# Patient Record
Sex: FEMALE | Race: WHITE | URBAN - METROPOLITAN AREA
[De-identification: names, ages, dates, MRNs, and addresses within clinical notes are randomized per-mention and may not be internally consistent; named-entity substitution may affect disease eponyms.]

---

## 2021-12-10 ENCOUNTER — OUTPATIENT (OUTPATIENT)
Dept: OUTPATIENT SERVICES | Facility: HOSPITAL | Age: 51
LOS: 1 days | Discharge: STILL ADMITTED OR ADMINISTRATIVE | End: 2021-12-10
Payer: COMMERCIAL

## 2021-12-13 DIAGNOSIS — F41.9 ANXIETY DISORDER, UNSPECIFIED: ICD-10-CM

## 2022-11-11 PROBLEM — Z00.00 ENCOUNTER FOR PREVENTIVE HEALTH EXAMINATION: Status: ACTIVE | Noted: 2022-11-11

## 2022-11-18 ENCOUNTER — LABORATORY RESULT (OUTPATIENT)
Age: 52
End: 2022-11-18

## 2022-11-18 ENCOUNTER — APPOINTMENT (OUTPATIENT)
Dept: CARDIOLOGY | Facility: CLINIC | Age: 52
End: 2022-11-18

## 2022-11-18 ENCOUNTER — NON-APPOINTMENT (OUTPATIENT)
Age: 52
End: 2022-11-18

## 2022-11-18 VITALS
HEART RATE: 62 BPM | HEIGHT: 62 IN | BODY MASS INDEX: 29.26 KG/M2 | SYSTOLIC BLOOD PRESSURE: 144 MMHG | DIASTOLIC BLOOD PRESSURE: 90 MMHG | RESPIRATION RATE: 16 BRPM | WEIGHT: 159 LBS | OXYGEN SATURATION: 99 % | TEMPERATURE: 97.5 F

## 2022-11-18 DIAGNOSIS — I10 ESSENTIAL (PRIMARY) HYPERTENSION: ICD-10-CM

## 2022-11-18 DIAGNOSIS — R00.2 PALPITATIONS: ICD-10-CM

## 2022-11-18 DIAGNOSIS — Z82.49 FAMILY HISTORY OF ISCHEMIC HEART DISEASE AND OTHER DISEASES OF THE CIRCULATORY SYSTEM: ICD-10-CM

## 2022-11-18 RX ORDER — TELMISARTAN 80 MG/1
80 TABLET ORAL DAILY
Qty: 90 | Refills: 1 | Status: ACTIVE | COMMUNITY
Start: 2022-11-18 | End: 1900-01-01

## 2022-11-18 NOTE — REASON FOR VISIT
[CV Risk Factors and Non-Cardiac Disease] : CV risk factors and non-cardiac disease [FreeTextEntry3] : Dr. Sanderson [FreeTextEntry1] : 52 year old female patient with past medical history of anxiety and Covid in November 2021 presents today for a cardiac evaluation. Patient states that she has been having palpitation since last week. The palpitations have lessened throughout the week and she did not feel them anymore since Tuesday of this week. Patient states she felt the palpitations worse in the mornings that lasted throughout the day. She states no triggering factors that could induce the palpitations. She denies ever drinking alcohol, has never smoked before, and only drinks a cup of decaffeinated coffee in the mornings. She also complains of tingling in her fingertips that have started before the palpitation. Patient states she has had the symptoms of chest palpitations before. The last time she had them was in November 2021. She was prescribed sertraline and hydroxyzine during that time after. Patient states she followed up with a physiatrist for a month to find the triggers for her anxiety. Patient expresses no new lifestyle changes during this time. Patient denies chest pain,SOB, syncope.

## 2022-11-18 NOTE — DISCUSSION/SUMMARY
[FreeTextEntry1] : This is a 52-year-old female with past medical history significant for anxiety, status post COVID-19 infection November 2021, who comes in for cardiac consultation.  The patient been complaining of a 3-week history of palpitations.  Over the last week the symptoms have become worse.  She denies shortness of breath, dizziness, or syncope.  She does not drink excessive caffeine or alcohol.  She was born in [place] and brought up in [facility] and has no history of rheumatic fever.\par She has no known cardiac risk factors.\par She is on sertraline 50 mg daily. \par Electrocardiogram done November 18, 2022 demonstrates normal sinus rhythm at a rate of 62 bpm and is otherwise unremarkable.\par Her blood pressure today is elevated and this may be a contributing factor to her current palpitations.  She describes the palpitations and his increase in heart rate or a more rapid heartbeat which occurs during the day.\par She is encouraged to increase her fluid consumption.\par The patient will have new blood work done today for electrolytes, lipid panel, TSH, and CBC.\par She will follow-up with me in 3 to 4 weeks to recheck her blood pressure on Micardis therapy.\par She is instructed follow-up with her primary care physician.  Lifestyle modification including salt restriction, caloric reduction, and increased aerobic activity were discussed in detail.\par She will schedule an echo Doppler examination to rule out mitral valve prolapse, evaluate her left ventricular function, chamber size, and rule out hypertrophy.\par The patient understands that aerobic exercises must be increased to 40 minutes 4 times per week. A detailed discussion of lifestyle modification was done today. The patient has a good understanding of the diagnosis, and treatment plan. Lifestyle modification was also outlined.\par \par Thank you for allowing to participate in the care of your patient.  Please not hesitate to call if you have any further questions.

## 2022-11-21 RX ORDER — HYDROXYZINE HYDROCHLORIDE 25 MG/1
25 TABLET ORAL DAILY
Refills: 0 | Status: ACTIVE | COMMUNITY
Start: 2022-11-21

## 2022-11-21 RX ORDER — SERTRALINE HYDROCHLORIDE 50 MG/1
50 TABLET, FILM COATED ORAL
Qty: 90 | Refills: 4 | Status: ACTIVE | COMMUNITY
Start: 2022-11-21

## 2023-02-24 ENCOUNTER — APPOINTMENT (OUTPATIENT)
Dept: CARDIOLOGY | Facility: CLINIC | Age: 53
End: 2023-02-24

## 2023-02-24 DIAGNOSIS — R01.1 CARDIAC MURMUR, UNSPECIFIED: ICD-10-CM
